# Patient Record
Sex: MALE | Race: WHITE | NOT HISPANIC OR LATINO | Employment: FULL TIME | URBAN - METROPOLITAN AREA
[De-identification: names, ages, dates, MRNs, and addresses within clinical notes are randomized per-mention and may not be internally consistent; named-entity substitution may affect disease eponyms.]

---

## 2017-01-13 ENCOUNTER — OFFICE VISIT (OUTPATIENT)
Dept: FAMILY MEDICINE CLINIC | Facility: CLINIC | Age: 27
End: 2017-01-13

## 2017-01-13 VITALS
OXYGEN SATURATION: 99 % | SYSTOLIC BLOOD PRESSURE: 122 MMHG | WEIGHT: 244 LBS | DIASTOLIC BLOOD PRESSURE: 70 MMHG | BODY MASS INDEX: 33.05 KG/M2 | HEART RATE: 64 BPM | RESPIRATION RATE: 16 BRPM | HEIGHT: 72 IN

## 2017-01-13 DIAGNOSIS — B07.0 PLANTAR WART: Primary | ICD-10-CM

## 2017-01-13 PROCEDURE — 99212 OFFICE O/P EST SF 10 MIN: CPT | Performed by: FAMILY MEDICINE

## 2017-01-13 PROCEDURE — 17110 DESTRUCTION B9 LES UP TO 14: CPT | Performed by: FAMILY MEDICINE

## 2017-01-13 NOTE — PROGRESS NOTES
"Nahomi Covarrubias is a 26 y.o. male.     History of Present Illness   He is concerned with his left palm and his chronic warts.  He feels like they are spreading.  He now has five warts that interfere with his activities of daily living.  He describes 2 large warts (~1 CM) and 3 smaller ones (~2-3 mm) on the palm of his left hand.  He is requesting to have them \"taken off.\"  He has tried multiple over the counter treatments including \"Dr. Perez's\" with no success.    Review of Systems   Skin:        Chronic warts to left palm       Objective   Physical Exam   Constitutional: He is oriented to person, place, and time. He appears well-developed and well-nourished.   HENT:   Head: Normocephalic.   Right Ear: Hearing normal.   Left Ear: Hearing normal.   Eyes: Conjunctivae and EOM are normal. Pupils are equal, round, and reactive to light.   Neck: Normal range of motion.   Cardiovascular: Normal rate and regular rhythm.    Pulmonary/Chest: Effort normal.   Neurological: He is alert and oriented to person, place, and time. Gait normal.   Skin: Skin is warm and dry.    2 large warts (~1 CM) and 3 smaller ones (~2-3 mm) on the palm of his left hand.   Psychiatric: He has a normal mood and affect.     Cryotherapy, Skin Lesion  Date/Time: 1/13/2017 3:30 PM  Performed by: LY BENDER  Authorized by: LY BENDER   Consent: Verbal consent obtained.  Risks and benefits: risks, benefits and alternatives were discussed  Consent given by: patient  Patient understanding: patient states understanding of the procedure being performed  Patient identity confirmed: verbally with patient  Patient tolerance: Patient tolerated the procedure well with no immediate complications  Comments: The left hand plantar wart lesions are identified.  The cryotherapy delivery device (liquid nitrogen) is used to provide ~10 seconds of freeze then thaw cycle on three separate occasions.  The patient tolerated the procedure well with no " complications.  5 warts received attention.  I reviewed care and skin protection instructions with the patient.          Assessment/Plan   Diagnoses and all orders for this visit:    Plantar wart of left hand  -     Cryotherapy, Skin Lesion  -  Topical care and protection advised  - We discussed warts & viruses  - I suspect the two larger lesions will need follow up treatment.  The patient voiced understanding.  - RTC as needed

## 2017-01-13 NOTE — MR AVS SNAPSHOT
"Real Covarrubias   2017 3:00 PM   Office Visit    Provider:  Hernán Burkett MD   Department:  [facility]   Dept Phone:  [phone]                Your Full Care Plan              Your Updated Medication List      Notice  As of 2017  3:38 PM    You have not been prescribed any medications.            Instructions     None    Patient Instructions History      [facility] Signup     [facility] "[employer] allows you to send messages to your doctor, view your test results, renew your prescriptions, schedule appointments, and more. To sign up, go to [employer] and click on the Sign Up Now link in the New User? box. Enter your "[employer] Activation Code exactly as it appears below along with the last four digits of your Social Security Number and your Date of Birth () to complete the sign-up process. If you do not sign up before the expiration date, you must request a new code.    "[employer] Activation Code: [email or web address]  Expires: 2017  3:38 PM    If you have questions, you can email [email or web address] or call [phone] to talk to our "[employer] staff. Remember, "[employer] is NOT to be used for urgent needs. For medical emergencies, dial 911.               Other Info from Your Visit           Allergies     Penicillins Allergy Hives      Vital Signs     Blood Pressure Pulse Respirations Height Weight Oxygen Saturation    122/70 64 16 72\" (182.9 cm) 244 lb (111 kg) 99%    Body Mass Index Smoking Status                33.09 kg/m2 Never Smoker          "

## 2017-02-08 ENCOUNTER — OFFICE VISIT (OUTPATIENT)
Dept: FAMILY MEDICINE CLINIC | Facility: CLINIC | Age: 27
End: 2017-02-08

## 2017-02-08 VITALS
HEART RATE: 68 BPM | SYSTOLIC BLOOD PRESSURE: 128 MMHG | WEIGHT: 246 LBS | DIASTOLIC BLOOD PRESSURE: 80 MMHG | HEIGHT: 72 IN | OXYGEN SATURATION: 98 % | TEMPERATURE: 98.2 F | BODY MASS INDEX: 33.32 KG/M2

## 2017-02-08 DIAGNOSIS — R50.9 FEVER, UNSPECIFIED FEVER CAUSE: ICD-10-CM

## 2017-02-08 DIAGNOSIS — J10.1 INFLUENZA A: Primary | ICD-10-CM

## 2017-02-08 LAB
EXPIRATION DATE: NORMAL
FLUAV AG NPH QL: POSITIVE
FLUBV AG NPH QL: NEGATIVE
INTERNAL CONTROL: NORMAL
Lab: NORMAL

## 2017-02-08 PROCEDURE — 99213 OFFICE O/P EST LOW 20 MIN: CPT | Performed by: NURSE PRACTITIONER

## 2017-02-08 PROCEDURE — 87804 INFLUENZA ASSAY W/OPTIC: CPT | Performed by: NURSE PRACTITIONER

## 2017-02-08 RX ORDER — OSELTAMIVIR PHOSPHATE 75 MG/1
75 CAPSULE ORAL 2 TIMES DAILY
Qty: 10 CAPSULE | Refills: 0 | Status: SHIPPED | OUTPATIENT
Start: 2017-02-08

## 2017-02-08 NOTE — PROGRESS NOTES
Nahomi Covarrubias is a 26 y.o. male.     History of Present Illness Gradual onset of myalgia, head congestion, sinus headache with pressure, fever to 101.3. Complains of clear rhinorrhea. No cough. No sore throat. Did not have a flu vacc. Known flu exposure. No GI symptoms.    The following portions of the patient's history were reviewed and updated as appropriate: allergies, current medications, past family history, past medical history, past social history, past surgical history and problem list.    Review of Systems   Constitutional: Positive for chills, diaphoresis and fever. Negative for fatigue and unexpected weight change.   HENT: Positive for rhinorrhea and sinus pressure. Negative for congestion, hearing loss, nosebleeds, sore throat, trouble swallowing and voice change.    Eyes: Negative for pain, discharge, redness and visual disturbance.   Respiratory: Negative for cough, chest tightness, shortness of breath and wheezing.    Cardiovascular: Negative for chest pain, palpitations and leg swelling.   Gastrointestinal: Negative for abdominal distention, abdominal pain, anal bleeding, blood in stool, constipation, diarrhea, nausea and vomiting.   Endocrine: Negative for cold intolerance, heat intolerance, polydipsia, polyphagia and polyuria.   Genitourinary: Negative for dysuria, flank pain, frequency and hematuria.   Musculoskeletal: Positive for myalgias. Negative for arthralgias, gait problem and joint swelling.   Skin: Negative for color change and rash.   Neurological: Positive for headaches. Negative for dizziness, tremors, seizures, syncope, speech difficulty, weakness and numbness.   Hematological: Negative.    Psychiatric/Behavioral: Negative.        Objective   Physical Exam   Constitutional: He is oriented to person, place, and time. He appears well-developed and well-nourished. No distress.   Pale clammy skin   HENT:   Head: Normocephalic and atraumatic.   Right Ear: Tympanic membrane and  external ear normal.   Left Ear: Tympanic membrane and external ear normal.   Nose: Nose normal.   Mouth/Throat: Oropharynx is clear and moist. No oropharyngeal exudate.   Eyes: Conjunctivae are normal. Pupils are equal, round, and reactive to light. Right eye exhibits no discharge. Left eye exhibits no discharge. No scleral icterus.   Neck: Neck supple. No tracheal deviation present. No thyromegaly present.   Cardiovascular: Normal rate, regular rhythm and normal heart sounds.  Exam reveals no gallop and no friction rub.    No murmur heard.  Pulmonary/Chest: Effort normal and breath sounds normal. No respiratory distress. He has no wheezes.   Abdominal: Soft. Bowel sounds are normal. He exhibits no distension and no mass. There is no tenderness.   Musculoskeletal: He exhibits no edema or deformity.   Lymphadenopathy:     He has no cervical adenopathy.   Neurological: He is alert and oriented to person, place, and time. Coordination normal.   Skin: Skin is warm. No rash noted. He is diaphoretic. No erythema.   Psychiatric: He has a normal mood and affect. His speech is normal and behavior is normal. Judgment and thought content normal.   Nursing note and vitals reviewed.      Assessment/Plan   Real was seen today for nasal congestion.    Diagnoses and all orders for this visit:    Influenza A    Fever, unspecified fever cause  -     POC Influenza A / B  -     oseltamivir (TAMIFLU) 75 MG capsule; Take 1 capsule by mouth 2 (Two) Times a Day.      Symptomatic treatment. Rest, fluids, isolation. Treat fever with Ibuprofen. Good hand washing. Follow up symptoms persist or worsen.